# Patient Record
Sex: FEMALE
[De-identification: names, ages, dates, MRNs, and addresses within clinical notes are randomized per-mention and may not be internally consistent; named-entity substitution may affect disease eponyms.]

---

## 2023-12-19 ENCOUNTER — APPOINTMENT (OUTPATIENT)
Dept: ORTHOPEDIC SURGERY | Facility: CLINIC | Age: 58
End: 2023-12-19
Payer: COMMERCIAL

## 2023-12-19 VITALS
OXYGEN SATURATION: 95 % | WEIGHT: 180 LBS | SYSTOLIC BLOOD PRESSURE: 140 MMHG | DIASTOLIC BLOOD PRESSURE: 83 MMHG | HEIGHT: 63 IN | HEART RATE: 76 BPM | BODY MASS INDEX: 31.89 KG/M2

## 2023-12-19 DIAGNOSIS — M23.306 OTHER MENISCUS DERANGEMENTS, UNSPECIFIED MENISCUS, RIGHT KNEE: ICD-10-CM

## 2023-12-19 DIAGNOSIS — M17.11 UNILATERAL PRIMARY OSTEOARTHRITIS, RIGHT KNEE: ICD-10-CM

## 2023-12-19 PROCEDURE — 99203 OFFICE O/P NEW LOW 30 MIN: CPT

## 2023-12-19 PROCEDURE — 73564 X-RAY EXAM KNEE 4 OR MORE: CPT | Mod: 50

## 2023-12-19 RX ORDER — MELOXICAM 7.5 MG/1
7.5 TABLET ORAL
Qty: 30 | Refills: 1 | Status: ACTIVE | COMMUNITY
Start: 2023-12-19 | End: 1900-01-01

## 2023-12-19 RX ORDER — HYALURONATE SODIUM, STABILIZED 60 MG/3 ML
60 SYRINGE (ML) INTRAARTICULAR
Qty: 1 | Refills: 0 | Status: ACTIVE | COMMUNITY
Start: 2023-12-19

## 2023-12-19 NOTE — HISTORY OF PRESENT ILLNESS
[de-identified] : KUNAL CHE is a 58 year old female who presents with right knee pain. States the onset of pain was several years with recent exacerbation in August 2023 following a quad stretch after a run. Had difficulty walking and flexing knee past 90 degress Hx meniscus tear s/p repair in 2007  Pain is anteromedial  Pain is nonradiating. There is associated swelling,  There is no associated stiffness, numbness, paraesthesia or weakness. Exacerbating factors are standing, walking for prolonged periods, climbing stairs, descending stairs, rising from seated position. Has tried ibuprofen, elevation Patient ambulates independently. Exercise: walking, running (not since Aug) Has not tried PT Not employed.

## 2023-12-19 NOTE — PHYSICAL EXAM
[de-identified] : General: Well-nourished, well-developed, alert, and in no acute distress. Head: Normocephalic. Eyes: Pupils equal, extraocular muscles intact, normal sclera. Nose: No nasal discharge. Cardiovascular: Extremities are warm and well perfused. Distal pulses are symmetric bilaterally. Respiratory: No labored breathing. Extremities: Sensation is intact distally bilaterally. Distal pulses are symmetric bilaterally Lymphatic: No regional lymphadenopathy, no lymphedema Neurologic: No focal deficits Skin: Normal skin color, texture, and turgor Psychiatric: Normal affect   MSK: Examination of [right] knee:   Gait [non-antalgic] Genu [valgum] alignment [No pain] with double leg squat Valgus moment with single leg squat Mild effusion No erythema, hematoma or skin lesion Tender to palpation:  medial joint line Nontender to palpation: lateral joint line, medial patellar facet, lateral patellar facet, quad tendon, patellar tendon, pes, Gerdy's tubercle, tibial tuberosity, popliteal fossa, hamstrings, ITB No warmth No Baker's cyst palpable ROM: 0-[100] [Mild] patellar crepitus   Log roll negative Lachman negative Anterior drawer negative Posterior drawer negative Varus/valgus stress negative at 0 and 30 deg Salvador negative Thessaly negative   Examination of [left] knee:   No effusion, erythema, hematoma or skin lesion Nontender to palpation: medial joint line, lateral joint line, medial patellar facet, lateral patellar facet, quad tendon, patellar tendon, pes, Gerdy's tubercle, tibial tuberosity, popliteal fossa, hamstrings, ITB No warmth No Baker's cyst palpable ROM: 0-110 [No] patellar crepitus   Log roll negative Lachman negative Anterior drawer negative Posterior drawer negative Varus/valgus stress negative at 0 and 30 deg [place] negativea Thessaly negative   Sensation is intact to light touch over the superficial and deep peroneal nerve distributions and the posterior tibial nerve distribution. Capillary refill is less than two seconds. Posterior tibial and dorsalis pedis pulses 2+ equal bilaterally. No calf swelling or tenderness bilaterally. Strength testing shows hip flexion 5/5, hip adduction 5/5, hip abduction 5/5, knee extension 5/5, knee flexion 5/5, dorsiflexion 5/5, plantar flexion 5/5, EHL 5/5 Reflexes: Patellar 2+, Achilles 2+  [de-identified] : XR bilateral knee [facility] (12/19/23): There is no evidence of fracture or dislocation. There is mild medial joint space narrowing with subchondral sclerosis bilaterally.

## 2023-12-19 NOTE — ASSESSMENT
[FreeTextEntry1] : KUNAL CHE is a 58 year old female with right knee pain. I discussed with the patient that their symptoms, signs, and imaging are most consistent with osteoarthritis and possible medial meniscus tear. We reviewed the natural history of this condition and treatment options ranging from conservative measures (activity modification, physical therapy, icing, oral anti-inflammatory and/or analgesic medications, steroid injection, HA gel injections, PRP injections) to surgical management. We agreed on the following plan:   XR taken and reviewed with patient today. Activity modification: low impact aerobic activity (stationary bike, elliptical, swimming) Recommend 150 min per week of moderate intensity aerobic activity Start Home Exercises for knee conditioning. Demonstration and handout provided. Physical therapy. Referral provided. Medication: Meloxicam 7.5-15mg daily prn prescription provided. Advanced imaging: consider MRI if no symptomatic improvement. Will place order for HA gel injection. Patient will be notified once authorized to schedule appointment.

## 2024-02-06 ENCOUNTER — APPOINTMENT (OUTPATIENT)
Dept: ORTHOPEDIC SURGERY | Facility: CLINIC | Age: 59
End: 2024-02-06

## 2024-02-18 NOTE — HISTORY OF PRESENT ILLNESS
[de-identified] : KUNAL CHE is a 58 year old female presents to follow up with right knee pain. Last visit was 12/19/23 at which time patient was advised to start home exercises and PT. States pain has

## 2024-02-18 NOTE — ASSESSMENT
[FreeTextEntry1] : KUNAL CHE is a 58 year old female with right knee pain. I discussed with the patient that their symptoms, signs, and imaging are most consistent with  **.  We reviewed the natural history of this condition and treatment options. We agreed on the following plan:  Encouraged to continue home exercises per handout. Continue physical therapy. Recommend 150 min per week of moderate intensity aerobic activity  Medication:    prescription provided. Imaging: Follow up in 6-8 weeks.